# Patient Record
Sex: FEMALE | Race: BLACK OR AFRICAN AMERICAN | Employment: FULL TIME | URBAN - METROPOLITAN AREA
[De-identification: names, ages, dates, MRNs, and addresses within clinical notes are randomized per-mention and may not be internally consistent; named-entity substitution may affect disease eponyms.]

---

## 2022-03-02 ENCOUNTER — APPOINTMENT (OUTPATIENT)
Dept: GENERAL RADIOLOGY | Age: 47
End: 2022-03-02
Payer: COMMERCIAL

## 2022-03-02 ENCOUNTER — HOSPITAL ENCOUNTER (EMERGENCY)
Age: 47
Discharge: HOME | End: 2022-03-02
Payer: COMMERCIAL

## 2022-03-02 VITALS
SYSTOLIC BLOOD PRESSURE: 146 MMHG | HEART RATE: 92 BPM | DIASTOLIC BLOOD PRESSURE: 89 MMHG | TEMPERATURE: 99.1 F | OXYGEN SATURATION: 99 % | RESPIRATION RATE: 16 BRPM

## 2022-03-02 DIAGNOSIS — T14.90XA OCCUPATIONAL INJURY: ICD-10-CM

## 2022-03-02 DIAGNOSIS — R03.0 ELEVATED BLOOD-PRESSURE READING WITHOUT DIAGNOSIS OF HYPERTENSION: ICD-10-CM

## 2022-03-02 DIAGNOSIS — S61.210A LACERATION OF RIGHT INDEX FINGER WITHOUT FOREIGN BODY WITHOUT DAMAGE TO NAIL, INITIAL ENCOUNTER: Primary | ICD-10-CM

## 2022-03-02 DIAGNOSIS — Z23 NEED FOR TETANUS BOOSTER: ICD-10-CM

## 2022-03-02 PROCEDURE — 90715 TDAP VACCINE 7 YRS/> IM: CPT

## 2022-03-02 PROCEDURE — 6360000002 HC RX W HCPCS

## 2022-03-02 PROCEDURE — 12001 RPR S/N/AX/GEN/TRNK 2.5CM/<: CPT

## 2022-03-02 PROCEDURE — 2500000003 HC RX 250 WO HCPCS

## 2022-03-02 PROCEDURE — 90471 IMMUNIZATION ADMIN: CPT

## 2022-03-02 PROCEDURE — 6370000000 HC RX 637 (ALT 250 FOR IP)

## 2022-03-02 PROCEDURE — 99283 EMERGENCY DEPT VISIT LOW MDM: CPT

## 2022-03-02 PROCEDURE — 73130 X-RAY EXAM OF HAND: CPT

## 2022-03-02 RX ORDER — BACITRACIN, NEOMYCIN, POLYMYXIN B 400; 3.5; 5 [USP'U]/G; MG/G; [USP'U]/G
OINTMENT TOPICAL 2 TIMES DAILY
Status: DISCONTINUED | OUTPATIENT
Start: 2022-03-02 | End: 2022-03-02 | Stop reason: HOSPADM

## 2022-03-02 RX ORDER — IBUPROFEN 400 MG/1
400 TABLET ORAL ONCE
Status: DISCONTINUED | OUTPATIENT
Start: 2022-03-02 | End: 2022-03-02 | Stop reason: HOSPADM

## 2022-03-02 RX ORDER — ACETAMINOPHEN 500 MG
1000 TABLET ORAL ONCE
Status: COMPLETED | OUTPATIENT
Start: 2022-03-02 | End: 2022-03-02

## 2022-03-02 RX ADMIN — LIDOCAINE HYDROCHLORIDE 5 ML: 10 INJECTION, SOLUTION EPIDURAL; INFILTRATION; INTRACAUDAL; PERINEURAL at 20:39

## 2022-03-02 RX ADMIN — TETANUS TOXOID, REDUCED DIPHTHERIA TOXOID AND ACELLULAR PERTUSSIS VACCINE, ADSORBED 0.5 ML: 5; 2.5; 8; 8; 2.5 SUSPENSION INTRAMUSCULAR at 20:07

## 2022-03-02 RX ADMIN — BACITRACIN ZINC, NEOMYCIN, POLYMYXIN B SULFAT: 5000; 3.5; 4 OINTMENT TOPICAL at 20:40

## 2022-03-02 RX ADMIN — ACETAMINOPHEN 1000 MG: 500 TABLET ORAL at 19:46

## 2022-03-02 ASSESSMENT — ENCOUNTER SYMPTOMS
SHORTNESS OF BREATH: 0
NAUSEA: 0
RHINORRHEA: 0
EYE PAIN: 0
CONSTIPATION: 0
COUGH: 0
SORE THROAT: 0
VOMITING: 0
BACK PAIN: 0
DIARRHEA: 0
ABDOMINAL PAIN: 0

## 2022-03-02 ASSESSMENT — PAIN SCALES - GENERAL
PAINLEVEL_OUTOF10: 1
PAINLEVEL_OUTOF10: 1

## 2022-03-02 NOTE — Clinical Note
Ron Banks was seen and treated in our emergency department on 3/2/2022. She may return to work on 03/04/2022. May return to work as cleared by [employer]. clinic. Must keep splint on until stitches are removed unless cleared by [employer]. clinic     If you have any questions or concerns, please don't hesitate to call.       Luisa Davenport, CARLIN - CNP

## 2022-03-03 NOTE — ED NOTES
Pt confirmed d/c paperwork have correct name. Discharge and education instructions reviewed with patient. Teach-back successful. Pt verbalized understanding and signed d/c papers. Pt denied questions at this time. No acute distress noted. Patient instructed to follow-up as noted - return to emergency department if symptoms worsen. Patient verbalized understanding. Discharged per EDMD with discharge instructions. Pt discharged per to private vehicle. Patient stable upon departure. Thanked patient for choosing [facility]) for care.               Kameron Perez RN  03/02/22 7272

## 2022-03-03 NOTE — ED PROVIDER NOTES
[address] [facility]        Pt Name: Luther Yates  MRN: [id number]  [place] 1975  Date of evaluation: 3/2/2022  Provider: CARLIN Weaver CNP  PCP: No primary care provider on file. Note Started: 7:46 PM EST      KENNY. I have evaluated this patient. My supervising physician was available for consultation. Triage CHIEF COMPLAINT       Chief Complaint   Patient presents with    Laceration     works at [employer], [place], cut R knuckle while trying to install a bolt; workers comp         HISTORY OF PRESENT ILLNESS   (Location/Symptom, Timing/Onset, Context/Setting, Quality, Duration, Modifying Factors, Severity)  Note limiting factors. Luther Yates is a 48 y.o. female who presents to the ED reporting she works at [employer] [place] and roughly at 6 PM was working on a transmission and her hand slipped off the tools he was using resulting in a cut to dorsal surface of right hand to the first MCP joint. No active bleeding. She does not know when her last tetanus shot was. Nursing Notes were all reviewed and agreed with or any disagreements were addressed in the HPI. REVIEW OF SYSTEMS    (2-9 systems for level 4, 10 or more for level 5)     Review of Systems   Constitutional: Negative for chills, diaphoresis and fever. HENT: Negative for congestion, rhinorrhea and sore throat. Eyes: Negative for pain and visual disturbance. Respiratory: Negative for cough and shortness of breath. Cardiovascular: Negative for chest pain and leg swelling. Gastrointestinal: Negative for abdominal pain, constipation, diarrhea, nausea and vomiting. Genitourinary: Negative for frequency and hematuria. Musculoskeletal: Negative for back pain and neck pain. Skin: Positive for wound. Negative for rash. Neurological: Negative for dizziness and light-headedness. PAST MEDICAL HISTORY   History reviewed.  No pertinent past medical history. SURGICAL HISTORY     Past Surgical History:   Procedure Laterality Date    TUBAL LIGATION         CURRENTMEDICATIONS       Previous Medications    IBUPROFEN (ADVIL;MOTRIN) 800 MG TABLET    Take 1 tablet by mouth every 8 hours as needed for Pain       ALLERGIES     Patient has no known allergies. FAMILYHISTORY     History reviewed. No pertinent family history. SOCIAL HISTORY       Social History     Socioeconomic History    Marital status: Single     Spouse name: None    Number of children: None    Years of education: None    Highest education level: None   Occupational History    None   Tobacco Use    Smoking status: Current Every Day Smoker     Packs/day: 0.50     Types: Cigarettes    Smokeless tobacco: Never Used   Substance and Sexual Activity    Alcohol use: Yes     Comment: social    Drug use: Yes     Types: Marijuana Gonzalo Natasha)    Sexual activity: Yes     Partners: Male   Other Topics Concern    None   Social History Narrative    None     Social Determinants of Health     Financial Resource Strain:     Difficulty of Paying Living Expenses: Not on file   Food Insecurity:     Worried About Running Out of Food in the Last Year: Not on file    Glenn of Food in the Last Year: Not on file   Transportation Needs:     Lack of Transportation (Medical): Not on file    Lack of Transportation (Non-Medical):  Not on file   Physical Activity:     Days of Exercise per Week: Not on file    Minutes of Exercise per Session: Not on file   Stress:     Feeling of Stress : Not on file   Social Connections:     Frequency of Communication with Friends and Family: Not on file    Frequency of Social Gatherings with Friends and Family: Not on file    Attends Restorationism Services: Not on file    Active Member of Clubs or Organizations: Not on file    Attends Club or Organization Meetings: Not on file    Marital Status: Not on file   Intimate Partner Violence:     Fear of Current or Ex-Partner: Not on file    Emotionally Abused: Not on file    Physically Abused: Not on file    Sexually Abused: Not on file   Housing Stability:     Unable to Pay for Housing in the Last Year: Not on file    Number of Places Lived in the Last Year: Not on file    Unstable Housing in the Last Year: Not on file       SCREENINGS    Alysia Coma Scale  Eye Opening: Spontaneous  Best Verbal Response: Oriented  Best Motor Response: Obeys commands  Alysia Coma Scale Score: 15        PHYSICAL EXAM    (up to 7 for level 4, 8 or more for level 5)     ED Triage Vitals [03/02/22 1915]   BP Temp Temp Source Pulse Resp SpO2 Height Weight   (!) 153/85 99.1 °F (37.3 °C) Oral 102 18 99 % -- --     Vitals:    03/02/22 2050   BP: (!) 146/89   Pulse: 92   Resp: 16   Temp: 99.1 °F (37.3 °C)   SpO2:        Physical Exam  Vitals and nursing note reviewed. Constitutional:       General: She is not in acute distress. Appearance: Normal appearance. She is not ill-appearing, toxic-appearing or diaphoretic. HENT:      Head: Normocephalic and atraumatic. Right Ear: External ear normal.      Left Ear: External ear normal.      Nose: Nose normal.      Mouth/Throat:      Mouth: Mucous membranes are moist.      Pharynx: Oropharynx is clear. Eyes:      General:         Right eye: No discharge. Left eye: No discharge. Cardiovascular:      Rate and Rhythm: Normal rate and regular rhythm. Pulses: Normal pulses. Heart sounds: Normal heart sounds. No murmur heard. No friction rub. No gallop. Pulmonary:      Effort: Pulmonary effort is normal. No respiratory distress. Breath sounds: Normal breath sounds. No stridor. No wheezing, rhonchi or rales. Abdominal:      General: There is no distension. Palpations: Abdomen is soft. Tenderness: There is no abdominal tenderness. Musculoskeletal:         General: Signs of injury present. No deformity. Normal range of motion.         Hands:       Cervical back: Normal range of motion and neck supple. Skin:     General: Skin is warm and dry. Capillary Refill: Capillary refill takes less than 2 seconds. Coloration: Skin is not pale. Findings: Laceration present. No rash. Comments: Roughly 1 cm laceration to right MCP joint on digit #1. Well approximated no active bleeding   Neurological:      General: No focal deficit present. Mental Status: She is alert and oriented to person, place, and time. Psychiatric:         Mood and Affect: Mood normal.         Behavior: Behavior normal.         DIAGNOSTIC RESULTS   LABS:    Labs Reviewed - No data to display    When ordered, only abnormal lab results are displayed. All other labs were within normal range or not returned as of this dictation. EKG: When ordered, EKG's are interpreted by the Emergency Department Physician in the absence of a cardiologist.  Please see their note for interpretation of EKG. RADIOLOGY:   Non-plain film images such as CT, Ultrasound and MRI are read by the radiologist. Plain radiographic images are visualized andpreliminarily interpreted by the  ED Provider with the below findings:        Interpretation perthe Radiologist below, if available at the time of this note:    XR HAND LEFT (MIN 3 VIEWS)   Final Result   No radiopaque foreign object is visualized. No acute fracture. No results found. PROCEDURES   Unless otherwise noted below, none     Lac Repair    Date/Time: 3/2/2022 8:34 PM  Performed by: CARLIN Tierney CNP  Authorized by: CARLIN Tierney CNP     Consent:     Consent obtained:  Verbal    Consent given by:  Patient    Risks discussed:  Infection, pain, retained foreign body, poor cosmetic result, nerve damage, poor wound healing, vascular damage and tendon damage    Alternatives discussed:  No treatment  Anesthesia (see MAR for exact dosages):      Anesthesia method:  Local infiltration    Local anesthetic:  Lidocaine 1% w/o epi and bupivacaine 0.5% w/o epi  Laceration details:     Location:  Hand    Hand location:  L hand, dorsum    Length (cm):  1.1    Depth (mm):  0.3  Repair type:     Repair type:  Simple  Pre-procedure details:     Preparation:  Patient was prepped and draped in usual sterile fashion and imaging obtained to evaluate for foreign bodies  Exploration:     Hemostasis achieved with:  Direct pressure    Wound exploration: wound explored through full range of motion      Wound extent: no foreign bodies/material noted      Contaminated: no    Treatment:     Area cleansed with:  Sarmad    Amount of cleaning:  Standard    Irrigation solution:  Sterile water    Visualized foreign bodies/material removed: no    Skin repair:     Repair method:  Sutures    Suture size:  4-0    Suture material:  Prolene    Suture technique:  Simple interrupted  Approximation:     Approximation:  Close  Post-procedure details:     Dressing:  Antibiotic ointment, non-adherent dressing, sterile dressing and splint for protection    Patient tolerance of procedure:   Tolerated well, no immediate complications        CRITICAL CARE TIME   N/A    CONSULTS:  None      EMERGENCY DEPARTMENT COURSE and DIFFERENTIAL DIAGNOSIS/MDM:   Vitals:    Vitals:    03/02/22 1915 03/02/22 2050   BP: (!) 153/85 (!) 146/89   Pulse: 102 92   Resp: 18 16   Temp: 99.1 °F (37.3 °C) 99.1 °F (37.3 °C)   TempSrc: Oral Oral   SpO2: 99%        Patient was given thefollowing medications:  Medications   ibuprofen (ADVIL;MOTRIN) tablet 400 mg (400 mg Oral Not Given 3/2/22 1947)   neomycin-bacitracin-polymyxin (NEOSPORIN) ointment ( Topical Given 3/2/22 2040)   acetaminophen (TYLENOL) tablet 1,000 mg (1,000 mg Oral Given 3/2/22 1946)   tetanus-diphth-acell pertussis (BOOSTRIX) injection 0.5 mL (0.5 mLs IntraMUSCular Given 3/2/22 2007)   lidocaine 1 % injection 5 mL (5 mLs IntraDERmal Given by Other 3/2/22 2039)           47-year-old female presenting roughly an hour and a half after a laceration. Please see presentation HPI. Differential diagnosis: Tendon laceration, neurologic injury, vascular injury, involvement of bone that could lead to osteomyelitis, retained foreign body, delayed bacterial skin infection, other  Patient is neurovascularly intact with excellent capillary refill distal to the injury. There is not appear to be any tendon damage at this time. Patient has excellent strength and range of motion distal to the injury. Sensation is intact distal to the injury. We will treat patient with some acetaminophen and ibuprofen, update her tetanus. This wound will require some sutures. Please see procedure note above. Patient is to be discharged home with follow-up at occupational health. I discussed with patient the results of evaluation in the ED, diagnosis, care, and prognosis. The plan is to discharge to home. Patient is in agreement with plan and questions have been answered. I also discussed with patient the reasons which may require a return visit and the importance of follow-up care. The patient is well-appearing, nontoxic, and improved at the time of discharge. Patient agrees to call to arrange follow-up care as directed. Patient understands to return immediately for worsening/change in symptoms. Incidentally, her blood pressure is very mildly elevated 146/89 on repeat. I suspect there is a component of pain and anxiety with the sutures causing this. However the patient will be referred to PCP for a blood pressure recheck did discuss this with the patient who is agreeable however this remains a work-related injury  FINAL IMPRESSION      1. Laceration of right index finger without foreign body without damage to nail, initial encounter    2. Need for tetanus booster    3. Elevated blood-pressure reading without diagnosis of hypertension    4.  Occupational injury          DISPOSITION/PLAN   DISPOSITION        PATIENT REFERREDTO:  [facility]) [address] [place] [address]  [phone]    Call in 1 day  For your Worker's Comp. injury.   You should make an appointment in 7 to 10 days to get the sutures removed    [facility]) Pre-Services  [phone]  Call in 2 days  As needed to establish care with a PCP      DISCHARGE MEDICATIONS:  New Prescriptions    No medications on file       DISCONTINUED MEDICATIONS:  Discontinued Medications    No medications on file              (Please note that portions ofthis note were completed with a voice recognition program.  Efforts were made to edit the dictations but occasionally words are mis-transcribed.)    CARLIN Gurrola CNP (electronically signed)             CARLIN Gurrola CNP  03/02/22 2101       CARLIN Gurrola CNP  03/02/22 2102

## 2024-09-09 ENCOUNTER — TELEPHONE (OUTPATIENT)
Dept: PRIMARY CARE CLINIC | Age: 49
End: 2024-09-09

## 2025-01-07 ENCOUNTER — TELEPHONE (OUTPATIENT)
Dept: PRIMARY CARE CLINIC | Age: 50
End: 2025-01-07

## 2025-01-07 NOTE — TELEPHONE ENCOUNTER
----- Message from Nathanael RILEY sent at 1/7/2025 10:35 AM EST -----  Regarding: [facility] Appointment Request  [facility] Appointment Request    Patient needs appointment for E[facility] Appointment Type: New Patient.    Patient Requested Dates(s): First Available  Patient Requested Time: Morning  Provider Name: Wilmer Hurley MD/ Ale Schneider DO/ Juliana Welsh MD      Reason for Appointment Request: New Patient - No appointments available during search    The patient wants to set an appointment to be establish for either Juliana Guillen or Ale, whoever is available first. The patient wants to be call back, because she isn't receiving one for the last she set an appointment request.  --------------------------------------------------------------------------------------------------------------------------    Relationship to Patient: Self     Call Back Information: OK to leave message on voicemail  Preferred Call Back Number: Phone [phone]

## 2025-02-21 ENCOUNTER — APPOINTMENT (OUTPATIENT)
Dept: GENERAL RADIOLOGY | Age: 50
DRG: 195 | End: 2025-02-21
Payer: COMMERCIAL

## 2025-02-21 ENCOUNTER — HOSPITAL ENCOUNTER (INPATIENT)
Age: 50
LOS: 2 days | Discharge: HOME | DRG: 195 | End: 2025-02-23
Attending: STUDENT IN AN ORGANIZED HEALTH CARE EDUCATION/TRAINING PROGRAM | Admitting: HOSPITALIST
Payer: COMMERCIAL

## 2025-02-21 ENCOUNTER — APPOINTMENT (OUTPATIENT)
Dept: CT IMAGING | Age: 50
DRG: 195 | End: 2025-02-21
Payer: COMMERCIAL

## 2025-02-21 DIAGNOSIS — J10.1 INFLUENZA A: ICD-10-CM

## 2025-02-21 DIAGNOSIS — J18.9 MULTIFOCAL PNEUMONIA: ICD-10-CM

## 2025-02-21 DIAGNOSIS — E87.6 HYPOKALEMIA: ICD-10-CM

## 2025-02-21 DIAGNOSIS — A41.9 SEPSIS, DUE TO UNSPECIFIED ORGANISM, UNSPECIFIED WHETHER ACUTE ORGAN DYSFUNCTION PRESENT (HCC): Primary | ICD-10-CM

## 2025-02-21 LAB
ALBUMIN SERPL-MCNC: 3.9 G/DL (ref 3.4–5)
ALBUMIN/GLOB SERPL: 1 {RATIO} (ref 1.1–2.2)
ALP SERPL-CCNC: 74 U/L (ref 40–129)
ALT SERPL-CCNC: 26 U/L (ref 10–40)
ANION GAP SERPL CALCULATED.3IONS-SCNC: 14 MMOL/L (ref 3–16)
AST SERPL-CCNC: 40 U/L (ref 15–37)
BASE EXCESS BLDV CALC-SCNC: 0 MMOL/L (ref -3–3)
BASOPHILS # BLD: 0.1 K/UL (ref 0–0.2)
BASOPHILS NFR BLD: 0.4 %
BILIRUB SERPL-MCNC: 0.5 MG/DL (ref 0–1)
BUN SERPL-MCNC: 4 MG/DL (ref 7–20)
CA-I BLD-SCNC: 1.08 MMOL/L (ref 1.12–1.32)
CALCIUM SERPL-MCNC: 9.4 MG/DL (ref 8.3–10.6)
CHLORIDE SERPL-SCNC: 98 MMOL/L (ref 99–110)
CO2 BLDV-SCNC: 24 MMOL/L
CO2 SERPL-SCNC: 20 MMOL/L (ref 21–32)
CREAT SERPL-MCNC: 0.6 MG/DL (ref 0.6–1.1)
DEPRECATED RDW RBC AUTO: 13.8 % (ref 12.4–15.4)
EKG ATRIAL RATE: 111 BPM
EKG DIAGNOSIS: NORMAL
EKG P AXIS: 73 DEGREES
EKG P-R INTERVAL: 130 MS
EKG Q-T INTERVAL: 328 MS
EKG QRS DURATION: 74 MS
EKG QTC CALCULATION (BAZETT): 446 MS
EKG R AXIS: -36 DEGREES
EKG T AXIS: 50 DEGREES
EKG VENTRICULAR RATE: 111 BPM
EOSINOPHIL # BLD: 0 K/UL (ref 0–0.6)
EOSINOPHIL NFR BLD: 0 %
GFR SERPLBLD CREATININE-BSD FMLA CKD-EPI: >90 ML/MIN/{1.73_M2}
GLUCOSE BLD-MCNC: 126 MG/DL (ref 70–99)
GLUCOSE SERPL-MCNC: 139 MG/DL (ref 70–99)
HCO3 BLDV-SCNC: 22.9 MMOL/L (ref 23–29)
HCT VFR BLD AUTO: 38 % (ref 36–48)
HCT VFR BLD AUTO: 42.2 % (ref 36–48)
HGB BLD CALC-MCNC: 13 GM/DL (ref 12–16)
HGB BLD-MCNC: 14.2 G/DL (ref 12–16)
LACTATE BLD-SCNC: 0.93 MMOL/L (ref 0.4–2)
LACTATE BLDV-SCNC: 1.3 MMOL/L (ref 0.4–1.9)
LACTATE BLDV-SCNC: 2.8 MMOL/L (ref 0.4–1.9)
LYMPHOCYTES # BLD: 1.3 K/UL (ref 1–5.1)
LYMPHOCYTES NFR BLD: 8.6 %
MCH RBC QN AUTO: 29.9 PG (ref 26–34)
MCHC RBC AUTO-ENTMCNC: 33.7 G/DL (ref 31–36)
MCV RBC AUTO: 88.9 FL (ref 80–100)
MONOCYTES # BLD: 1.3 K/UL (ref 0–1.3)
MONOCYTES NFR BLD: 8.5 %
NEUTROPHILS # BLD: 12.7 K/UL (ref 1.7–7.7)
NEUTROPHILS NFR BLD: 82.5 %
NT-PROBNP SERPL-MCNC: 73 PG/ML (ref 0–124)
PCO2 BLDV: 28.9 MM HG (ref 40–50)
PERFORMED ON: ABNORMAL
PERFORMED ON: ABNORMAL
PH BLDV: 7.51 [PH] (ref 7.35–7.45)
PLATELET # BLD AUTO: 382 K/UL (ref 135–450)
PMV BLD AUTO: 8.6 FL (ref 5–10.5)
PO2 BLDV: 55 MM HG
POC CREATININE: 0.4 MG/DL (ref 0.6–1.1)
POC SAMPLE TYPE: ABNORMAL
POC SAMPLE TYPE: ABNORMAL
POTASSIUM BLD-SCNC: 3.1 MMOL/L (ref 3.5–5.1)
POTASSIUM SERPL-SCNC: 4.3 MMOL/L (ref 3.5–5.1)
PROT SERPL-MCNC: 7.7 G/DL (ref 6.4–8.2)
RBC # BLD AUTO: 4.75 M/UL (ref 4–5.2)
SAO2 % BLDV: 91 %
SODIUM BLD-SCNC: 139 MMOL/L (ref 136–145)
SODIUM SERPL-SCNC: 132 MMOL/L (ref 136–145)
TROPONIN, HIGH SENSITIVITY: <6 NG/L (ref 0–14)
WBC # BLD AUTO: 15.4 K/UL (ref 4–11)

## 2025-02-21 PROCEDURE — 94640 AIRWAY INHALATION TREATMENT: CPT

## 2025-02-21 PROCEDURE — 94760 N-INVAS EAR/PLS OXIMETRY 1: CPT

## 2025-02-21 PROCEDURE — 83880 ASSAY OF NATRIURETIC PEPTIDE: CPT

## 2025-02-21 PROCEDURE — 1200000000 HC SEMI PRIVATE

## 2025-02-21 PROCEDURE — 2580000003 HC RX 258: Performed by: HOSPITALIST

## 2025-02-21 PROCEDURE — 2580000003 HC RX 258: Performed by: PHYSICIAN ASSISTANT

## 2025-02-21 PROCEDURE — 96374 THER/PROPH/DIAG INJ IV PUSH: CPT

## 2025-02-21 PROCEDURE — 84295 ASSAY OF SERUM SODIUM: CPT

## 2025-02-21 PROCEDURE — 36415 COLL VENOUS BLD VENIPUNCTURE: CPT

## 2025-02-21 PROCEDURE — 6370000000 HC RX 637 (ALT 250 FOR IP): Performed by: PHYSICIAN ASSISTANT

## 2025-02-21 PROCEDURE — 80053 COMPREHEN METABOLIC PANEL: CPT

## 2025-02-21 PROCEDURE — 6360000004 HC RX CONTRAST MEDICATION: Performed by: PHYSICIAN ASSISTANT

## 2025-02-21 PROCEDURE — 96375 TX/PRO/DX INJ NEW DRUG ADDON: CPT

## 2025-02-21 PROCEDURE — 99285 EMERGENCY DEPT VISIT HI MDM: CPT

## 2025-02-21 PROCEDURE — 85014 HEMATOCRIT: CPT

## 2025-02-21 PROCEDURE — 82565 ASSAY OF CREATININE: CPT

## 2025-02-21 PROCEDURE — 87040 BLOOD CULTURE FOR BACTERIA: CPT

## 2025-02-21 PROCEDURE — 82803 BLOOD GASES ANY COMBINATION: CPT

## 2025-02-21 PROCEDURE — 2500000003 HC RX 250 WO HCPCS: Performed by: HOSPITALIST

## 2025-02-21 PROCEDURE — 93005 ELECTROCARDIOGRAM TRACING: CPT | Performed by: STUDENT IN AN ORGANIZED HEALTH CARE EDUCATION/TRAINING PROGRAM

## 2025-02-21 PROCEDURE — 2580000003 HC RX 258: Performed by: STUDENT IN AN ORGANIZED HEALTH CARE EDUCATION/TRAINING PROGRAM

## 2025-02-21 PROCEDURE — 93010 ELECTROCARDIOGRAM REPORT: CPT | Performed by: INTERNAL MEDICINE

## 2025-02-21 PROCEDURE — 2500000003 HC RX 250 WO HCPCS: Performed by: STUDENT IN AN ORGANIZED HEALTH CARE EDUCATION/TRAINING PROGRAM

## 2025-02-21 PROCEDURE — 71046 X-RAY EXAM CHEST 2 VIEWS: CPT

## 2025-02-21 PROCEDURE — 85025 COMPLETE CBC W/AUTO DIFF WBC: CPT

## 2025-02-21 PROCEDURE — 6360000002 HC RX W HCPCS: Performed by: PHYSICIAN ASSISTANT

## 2025-02-21 PROCEDURE — 71260 CT THORAX DX C+: CPT

## 2025-02-21 PROCEDURE — 84132 ASSAY OF SERUM POTASSIUM: CPT

## 2025-02-21 PROCEDURE — 83605 ASSAY OF LACTIC ACID: CPT

## 2025-02-21 PROCEDURE — 82330 ASSAY OF CALCIUM: CPT

## 2025-02-21 PROCEDURE — 6370000000 HC RX 637 (ALT 250 FOR IP): Performed by: HOSPITALIST

## 2025-02-21 PROCEDURE — 84484 ASSAY OF TROPONIN QUANT: CPT

## 2025-02-21 PROCEDURE — 82947 ASSAY GLUCOSE BLOOD QUANT: CPT

## 2025-02-21 PROCEDURE — 6360000002 HC RX W HCPCS: Performed by: HOSPITALIST

## 2025-02-21 PROCEDURE — 6360000002 HC RX W HCPCS: Performed by: STUDENT IN AN ORGANIZED HEALTH CARE EDUCATION/TRAINING PROGRAM

## 2025-02-21 RX ORDER — ACETAMINOPHEN 325 MG/1
650 TABLET ORAL EVERY 6 HOURS PRN
Status: DISCONTINUED | OUTPATIENT
Start: 2025-02-21 | End: 2025-02-23 | Stop reason: HOSPADM

## 2025-02-21 RX ORDER — MAGNESIUM 30 MG
30 TABLET ORAL 2 TIMES DAILY
COMMUNITY

## 2025-02-21 RX ORDER — POTASSIUM CHLORIDE 1500 MG/1
40 TABLET, EXTENDED RELEASE ORAL ONCE
Status: COMPLETED | OUTPATIENT
Start: 2025-02-21 | End: 2025-02-21

## 2025-02-21 RX ORDER — SODIUM CHLORIDE 9 MG/ML
INJECTION, SOLUTION INTRAVENOUS CONTINUOUS
Status: DISCONTINUED | OUTPATIENT
Start: 2025-02-21 | End: 2025-02-22

## 2025-02-21 RX ORDER — LECITHIN 1200 MG
160 CAPSULE ORAL DAILY
COMMUNITY

## 2025-02-21 RX ORDER — METHYLPREDNISOLONE SODIUM SUCCINATE 125 MG/2ML
125 INJECTION INTRAMUSCULAR; INTRAVENOUS ONCE
Status: COMPLETED | OUTPATIENT
Start: 2025-02-21 | End: 2025-02-21

## 2025-02-21 RX ORDER — ALBUTEROL SULFATE 0.83 MG/ML
2.5 SOLUTION RESPIRATORY (INHALATION)
Status: DISCONTINUED | OUTPATIENT
Start: 2025-02-21 | End: 2025-02-21

## 2025-02-21 RX ORDER — ENOXAPARIN SODIUM 100 MG/ML
40 INJECTION SUBCUTANEOUS DAILY
Status: DISCONTINUED | OUTPATIENT
Start: 2025-02-22 | End: 2025-02-23 | Stop reason: HOSPADM

## 2025-02-21 RX ORDER — SODIUM CHLORIDE 0.9 % (FLUSH) 0.9 %
5-40 SYRINGE (ML) INJECTION PRN
Status: DISCONTINUED | OUTPATIENT
Start: 2025-02-21 | End: 2025-02-23 | Stop reason: HOSPADM

## 2025-02-21 RX ORDER — ALBUTEROL SULFATE 0.83 MG/ML
2.5 SOLUTION RESPIRATORY (INHALATION)
Status: DISCONTINUED | OUTPATIENT
Start: 2025-02-21 | End: 2025-02-22

## 2025-02-21 RX ORDER — ONDANSETRON 2 MG/ML
4 INJECTION INTRAMUSCULAR; INTRAVENOUS EVERY 6 HOURS PRN
Status: DISCONTINUED | OUTPATIENT
Start: 2025-02-21 | End: 2025-02-23 | Stop reason: HOSPADM

## 2025-02-21 RX ORDER — 0.9 % SODIUM CHLORIDE 0.9 %
1000 INTRAVENOUS SOLUTION INTRAVENOUS ONCE
Status: COMPLETED | OUTPATIENT
Start: 2025-02-21 | End: 2025-02-21

## 2025-02-21 RX ORDER — MAGNESIUM SULFATE IN WATER 40 MG/ML
2000 INJECTION, SOLUTION INTRAVENOUS PRN
Status: DISCONTINUED | OUTPATIENT
Start: 2025-02-21 | End: 2025-02-23 | Stop reason: HOSPADM

## 2025-02-21 RX ORDER — POLYETHYLENE GLYCOL 3350 17 G/17G
17 POWDER, FOR SOLUTION ORAL DAILY PRN
Status: DISCONTINUED | OUTPATIENT
Start: 2025-02-21 | End: 2025-02-23 | Stop reason: HOSPADM

## 2025-02-21 RX ORDER — GUAIFENESIN/DEXTROMETHORPHAN 100-10MG/5
5 SYRUP ORAL EVERY 4 HOURS PRN
Status: DISCONTINUED | OUTPATIENT
Start: 2025-02-21 | End: 2025-02-23 | Stop reason: HOSPADM

## 2025-02-21 RX ORDER — IOPAMIDOL 755 MG/ML
75 INJECTION, SOLUTION INTRAVASCULAR
Status: COMPLETED | OUTPATIENT
Start: 2025-02-21 | End: 2025-02-21

## 2025-02-21 RX ORDER — SODIUM CHLORIDE 0.9 % (FLUSH) 0.9 %
5-40 SYRINGE (ML) INJECTION EVERY 12 HOURS SCHEDULED
Status: DISCONTINUED | OUTPATIENT
Start: 2025-02-21 | End: 2025-02-23 | Stop reason: HOSPADM

## 2025-02-21 RX ORDER — ACETAMINOPHEN 325 MG/1
650 TABLET ORAL EVERY 6 HOURS PRN
COMMUNITY

## 2025-02-21 RX ORDER — IPRATROPIUM BROMIDE AND ALBUTEROL SULFATE 2.5; .5 MG/3ML; MG/3ML
3 SOLUTION RESPIRATORY (INHALATION) ONCE
Status: COMPLETED | OUTPATIENT
Start: 2025-02-21 | End: 2025-02-21

## 2025-02-21 RX ORDER — GUAIFENESIN 600 MG/1
600 TABLET, EXTENDED RELEASE ORAL 2 TIMES DAILY
Status: DISCONTINUED | OUTPATIENT
Start: 2025-02-21 | End: 2025-02-23 | Stop reason: HOSPADM

## 2025-02-21 RX ORDER — ACETAMINOPHEN 500 MG
1000 TABLET ORAL ONCE
Status: COMPLETED | OUTPATIENT
Start: 2025-02-21 | End: 2025-02-21

## 2025-02-21 RX ORDER — SODIUM CHLORIDE 9 MG/ML
INJECTION, SOLUTION INTRAVENOUS PRN
Status: DISCONTINUED | OUTPATIENT
Start: 2025-02-21 | End: 2025-02-23 | Stop reason: HOSPADM

## 2025-02-21 RX ORDER — ONDANSETRON 4 MG/1
4 TABLET, ORALLY DISINTEGRATING ORAL EVERY 8 HOURS PRN
Status: DISCONTINUED | OUTPATIENT
Start: 2025-02-21 | End: 2025-02-23 | Stop reason: HOSPADM

## 2025-02-21 RX ORDER — POTASSIUM CHLORIDE 1500 MG/1
40 TABLET, EXTENDED RELEASE ORAL PRN
Status: DISCONTINUED | OUTPATIENT
Start: 2025-02-21 | End: 2025-02-23 | Stop reason: HOSPADM

## 2025-02-21 RX ORDER — POTASSIUM CHLORIDE 7.45 MG/ML
10 INJECTION INTRAVENOUS PRN
Status: DISCONTINUED | OUTPATIENT
Start: 2025-02-21 | End: 2025-02-23 | Stop reason: HOSPADM

## 2025-02-21 RX ORDER — ACETAMINOPHEN 650 MG/1
650 SUPPOSITORY RECTAL EVERY 6 HOURS PRN
Status: DISCONTINUED | OUTPATIENT
Start: 2025-02-21 | End: 2025-02-23 | Stop reason: HOSPADM

## 2025-02-21 RX ADMIN — ALBUTEROL SULFATE 2.5 MG: 2.5 SOLUTION RESPIRATORY (INHALATION) at 19:52

## 2025-02-21 RX ADMIN — SODIUM CHLORIDE: 9 INJECTION, SOLUTION INTRAVENOUS at 21:03

## 2025-02-21 RX ADMIN — IOPAMIDOL 75 ML: 755 INJECTION, SOLUTION INTRAVENOUS at 13:45

## 2025-02-21 RX ADMIN — SODIUM CHLORIDE 1000 ML: 9 INJECTION, SOLUTION INTRAVENOUS at 14:31

## 2025-02-21 RX ADMIN — WATER 1000 MG: 1 INJECTION INTRAMUSCULAR; INTRAVENOUS; SUBCUTANEOUS at 14:29

## 2025-02-21 RX ADMIN — GUAIFENESIN 600 MG: 600 TABLET ORAL at 20:55

## 2025-02-21 RX ADMIN — ONDANSETRON 4 MG: 2 INJECTION, SOLUTION INTRAMUSCULAR; INTRAVENOUS at 16:40

## 2025-02-21 RX ADMIN — ACETAMINOPHEN 1000 MG: 500 TABLET ORAL at 14:35

## 2025-02-21 RX ADMIN — IPRATROPIUM BROMIDE AND ALBUTEROL SULFATE 3 DOSE: .5; 2.5 SOLUTION RESPIRATORY (INHALATION) at 14:27

## 2025-02-21 RX ADMIN — AZITHROMYCIN MONOHYDRATE 500 MG: 500 INJECTION, POWDER, LYOPHILIZED, FOR SOLUTION INTRAVENOUS at 14:35

## 2025-02-21 RX ADMIN — SODIUM CHLORIDE, PRESERVATIVE FREE 10 ML: 5 INJECTION INTRAVENOUS at 21:02

## 2025-02-21 RX ADMIN — IPRATROPIUM BROMIDE 0.5 MG: 0.5 SOLUTION RESPIRATORY (INHALATION) at 19:52

## 2025-02-21 RX ADMIN — POTASSIUM CHLORIDE 40 MEQ: 1500 TABLET, EXTENDED RELEASE ORAL at 14:35

## 2025-02-21 RX ADMIN — METHYLPREDNISOLONE SODIUM SUCCINATE 125 MG: 125 INJECTION INTRAMUSCULAR; INTRAVENOUS at 14:33

## 2025-02-21 ASSESSMENT — PAIN SCALES - GENERAL: PAINLEVEL_OUTOF10: 7

## 2025-02-21 ASSESSMENT — PAIN - FUNCTIONAL ASSESSMENT: PAIN_FUNCTIONAL_ASSESSMENT: 0-10

## 2025-02-21 ASSESSMENT — PAIN DESCRIPTION - LOCATION: LOCATION: BACK

## 2025-02-21 NOTE — PROGRESS NOTES
Medication Reconciliation    List of medications patient is currently taking is complete.     Source of information: 1. Conversation with patient at bedside                                      2. EPIC records         Notes regarding home medications:   1. Patient prescribed Augmentin 875/125 mg BID x 7 days on 2/19. States she took a dose this morning      Sukh Stevenson, [facility]   2/21/2025  3:03 PM

## 2025-02-21 NOTE — H&P
V2.0  History and Physical      Name:  Octavia Gonsalez /Age/Sex: 1975  (49 y.o. female)   MRN & CSN:  [id number] & [id number] Encounter Date/Time: 2025 2:49 PM EST   [facility] PCP: No primary care provider on file.       Hospital Day: 1    Assessment and Plan:   Octavia Gonsalez is a 49 y.o. female with a pmh of  who presents with Pneumonia, unspecified organism    Hospital Problems             Last Modified POA    * (Principal) Pneumonia, unspecified organism 2025 Yes       Failure of outpatient Antibiotics therapy    Plan:            Admit inpatient status  Telemetry monitering  Empiric IV antibiotics : rocephin and zithromax  Duonebs   O2 via NC to keep O2 sat > 90 %  Check PNA panl , Procal  Check MRSA nasal probe  Blood culture x 2        Chronic conditions :    Recently quit smoking as per patient          DVT ppx  : lovenox    Diet : regular diet    Code status  : full code          Disposition:   Current Living situation: Home  Expected Disposition:home  Estimated D/C: 2-3 days    Diet No diet orders on file   DVT Prophylaxis [] Lovenox, []  Heparin, [] SCDs, [] Ambulation,  [] Eliquis, [] Xarelto, [] Coumadin   Code Status No Order   Surrogate Decision Maker/ POA        Personally reviewed Lab Studies and Imaging      Discussed management of the case with ED  who recommended Hospital admission     EKG interpreted personally and results : no STEMI     Imaging that was interpreted personally includes  CXR  and results :  PNA +     Drugs that require monitoring for toxicity include IVF , IV Antibiotics rocephin  and the method of monitoring was Vitals monitering, BMP and CBC monitering , Telemetry monitering          History from:     patient    History of Present Illness:     Chief Complaint:   Octavia Gonsalez is a 49 y.o. female with pmh of  who presents with     C/o cough  C/o SOB  C/o fever  C/o generalized weakness     Onset of symptoms since ~ 8- 9 days , she went to  few days

## 2025-02-21 NOTE — ACP (ADVANCE CARE PLANNING)
Advance Care Planning   Healthcare Decision Maker:    Primary Decision Maker: Lalita Guerra - [facility] - [phone]      Today we documented Decision Maker(s) consistent with Legal Next of Kin hierarchy.       Electronically signed by Yaneth Oliva on 2/21/2025 at 3:45 PM

## 2025-02-21 NOTE — ED PROVIDER NOTES
ED Attending Attestation Note    I personally saw the patient and made/approved the management plan and take responsibility for patient management.  Staffed with me at: 1324.    Briefly, 49 y.o. female presents with recent diagnosis of pneumonia has been taken antibiotics but has had no improvement and came in with worsening overall symptoms.       Focused exam:   Physical Exam  Vitals and nursing note reviewed.   Constitutional:       General: She is in acute distress.      Appearance: She is ill-appearing. She is not toxic-appearing or diaphoretic.   HENT:      Head: Normocephalic and atraumatic.      Right Ear: External ear normal.      Left Ear: External ear normal.      Nose: Nose normal.   Eyes:      Conjunctiva/sclera: Conjunctivae normal.   Cardiovascular:      Rate and Rhythm: Normal rate and regular rhythm.   Pulmonary:      Effort: Pulmonary effort is normal. No respiratory distress.      Breath sounds: Rhonchi present.   Abdominal:      General: There is no distension.      Tenderness: There is no abdominal tenderness. There is no right CVA tenderness, left CVA tenderness, guarding or rebound.   Skin:     General: Skin is warm and dry.      Capillary Refill: Capillary refill takes less than 2 seconds.   Neurological:      Mental Status: She is alert.           Imaging:   CT CHEST PULMONARY EMBOLISM W CONTRAST   Final Result   1.  No evidence of pulmonary embolism.      2.  Scattered ground-glass pulmonary infiltrates compatible with multifocal   pneumonia         XR CHEST (2 VW)   Final Result   Patchy airspace opacity in the left middle and right perihilar lung zones.   Recommend follow-up until clear.                  I independently interpreted the following studies:   ECG: Sinus tachycardia with ventricular rate of 111, NH of 130, QRS 74, QTc 446, left axis deviation, no clinically significant ST segment elevation or depression, no significant T wave abnormality noted unchanged from prior EKG from

## 2025-02-21 NOTE — CARE COORDINATION
Case Management Assessment  Initial Evaluation    Date/Time of Evaluation: 2/21/2025 3:38 PM  Assessment Completed by: Yaneth Oliva    If patient is discharged prior to next notation, then this note serves as note for discharge by case management.    Patient Name: Octavia Gonsalez                   YOB: 1975  Diagnosis: Pneumonia, unspecified organism [J18.9]                   Date / Time: 2/21/2025 12:33 PM    Patient Admission Status: Inpatient   Readmission Risk (Low < 19, Mod (19-27), High > 27): No data recorded  Current PCP: No primary care provider on file.  PCP verified by CM? Yes    Chart Reviewed: Yes      History Provided by: Patient  Patient Orientation: Alert and Oriented    Patient Cognition: Alert    Hospitalization in the last 30 days (Readmission):  No    If yes, Readmission Assessment in CM Navigator will be completed.    Advance Directives:      Code Status: Not on file   Patient's Primary Decision Maker is: Legal Next of Kin      Discharge Planning:    Patient lives with: Parent Type of Home: Apartment  Primary Care Giver: Self  Patient Support Systems include: Family Members   Current Financial resources: Other (Comment) (negrito)  Current community resources: None  Current services prior to admission: None            Current DME:              Type of Home Care services:  None    ADLS  Prior functional level: Independent in ADLs/IADLs  Current functional level: Independent in ADLs/IADLs    PT AM-PAC:   /24  OT AM-PAC:   /24    Family can provide assistance at DC: Yes  Would you like Case Management to discuss the discharge plan with any other family members/significant others, and if so, who? No  Plans to Return to Present Housing: Yes  Other Identified Issues/Barriers to RETURNING to current housing: None  Potential Assistance needed at discharge: N/A            Potential DME:    Patient expects to discharge to: Apartment  Plan for transportation at discharge: Other (see

## 2025-02-21 NOTE — ED TRIAGE NOTES
Pt was diagnosed with pneumonia on Wednesday at . Pt has been taken amoxicillin since then, and she says she hasn't felt any better. Pt complains of lower back and abdomen pain, chills, cough, and fever.

## 2025-02-21 NOTE — ED PROVIDER NOTES
[facility] EMERGENCY DEPARTMENT  EMERGENCY DEPARTMENT ENCOUNTER        Pt Name: Octavia Gonsalez  MRN: [id number]  Birthdate 1975  Date of evaluation: 2/21/2025  Provider: Sara Aguilar PA-C  PCP: Felicity Barkley MD  Note Started: 2:04 PM EST 2/21/25       I have seen and evaluated this patient with my supervising physician Stephen Sue, *.      CHIEF COMPLAINT       Chief Complaint   Patient presents with    Illness     Pt was diagnosed with pneumonia on Wednesday at . Pt has been taken amoxicillin since then, and she says she hasn't felt any better. Pt complains of lower back and abdomen pain, chills, cough, and fever.        HISTORY OF PRESENT ILLNESS: 1 or more Elements     History From: patient    Octavia Gonsalez is a 49 y.o. female who presents for worsening pneumonia.  Patient was seen 2 days ago a  emergency department and found to have multifocal pneumonia.  She was started on Augmentin which he has been taking.  She reports her cough is worsening and now she short of breath.  She is also had subjective fever and chills.  Cough is productive of white sputum.  She denies hemoptysis.  She denies chest pain.  Denies nausea vomiting.  Her cold symptoms initially started about 1.5 weeks ago.  Reports she was negative for flu and COVID at  ED visit.  She smokes 1/2 pack a day.  No history of lung disease.  History of hypertension.  No other health problems.      Nursing Notes were reviewed and agreed with or any disagreements were addressed in the HPI.    REVIEW OF SYSTEMS :      Review of Systems    Positives and Pertinent negatives as per HPI.     SURGICAL HISTORY     Past Surgical History:   Procedure Laterality Date    TUBAL LIGATION         CURRENTMEDICATIONS       Previous Medications    ACETAMINOPHEN (TYLENOL) 325 MG TABLET    Take 2 tablets by mouth every 6 hours as needed for Pain or Fever    AMOXICILLIN-CLAVULANATE (AUGMENTIN) 875-125 MG PER TABLET    Take 1

## 2025-02-22 LAB
ANION GAP SERPL CALCULATED.3IONS-SCNC: 14 MMOL/L (ref 3–16)
BACTERIA URNS QL MICRO: NORMAL /HPF
BASOPHILS # BLD: 0 K/UL (ref 0–0.2)
BASOPHILS NFR BLD: 0.2 %
BILIRUB UR QL STRIP.AUTO: NEGATIVE
BUN SERPL-MCNC: 4 MG/DL (ref 7–20)
CALCIUM SERPL-MCNC: 9.4 MG/DL (ref 8.3–10.6)
CHLORIDE SERPL-SCNC: 105 MMOL/L (ref 99–110)
CLARITY UR: CLEAR
CO2 SERPL-SCNC: 19 MMOL/L (ref 21–32)
COLOR UR: YELLOW
CREAT SERPL-MCNC: 0.7 MG/DL (ref 0.6–1.1)
DEPRECATED RDW RBC AUTO: 13.7 % (ref 12.4–15.4)
EOSINOPHIL # BLD: 0 K/UL (ref 0–0.6)
EOSINOPHIL NFR BLD: 0 %
EPI CELLS #/AREA URNS AUTO: 3 /HPF (ref 0–5)
GFR SERPLBLD CREATININE-BSD FMLA CKD-EPI: >90 ML/MIN/{1.73_M2}
GLUCOSE SERPL-MCNC: 243 MG/DL (ref 70–99)
GLUCOSE UR STRIP.AUTO-MCNC: >=1000 MG/DL
HCT VFR BLD AUTO: 39.2 % (ref 36–48)
HGB BLD-MCNC: 13 G/DL (ref 12–16)
HGB UR QL STRIP.AUTO: NEGATIVE
HYALINE CASTS #/AREA URNS AUTO: 0 /LPF (ref 0–8)
KETONES UR STRIP.AUTO-MCNC: NEGATIVE MG/DL
LEUKOCYTE ESTERASE UR QL STRIP.AUTO: NEGATIVE
LYMPHOCYTES # BLD: 1 K/UL (ref 1–5.1)
LYMPHOCYTES NFR BLD: 7.6 %
MCH RBC QN AUTO: 29.5 PG (ref 26–34)
MCHC RBC AUTO-ENTMCNC: 33.1 G/DL (ref 31–36)
MCV RBC AUTO: 89.1 FL (ref 80–100)
MONOCYTES # BLD: 1 K/UL (ref 0–1.3)
MONOCYTES NFR BLD: 7.5 %
MRSA DNA SPEC QL NAA+PROBE: NORMAL
NEUTROPHILS # BLD: 10.9 K/UL (ref 1.7–7.7)
NEUTROPHILS NFR BLD: 84.7 %
NITRITE UR QL STRIP.AUTO: NEGATIVE
ORGANISM: ABNORMAL
PH UR STRIP.AUTO: 7.5 [PH] (ref 5–8)
PLATELET # BLD AUTO: 414 K/UL (ref 135–450)
PMV BLD AUTO: 8.8 FL (ref 5–10.5)
POTASSIUM SERPL-SCNC: 4.1 MMOL/L (ref 3.5–5.1)
PROT UR STRIP.AUTO-MCNC: ABNORMAL MG/DL
RBC # BLD AUTO: 4.4 M/UL (ref 4–5.2)
RBC CLUMPS #/AREA URNS AUTO: 1 /HPF (ref 0–4)
REASON FOR REJECTION: NORMAL
REJECTED TEST: NORMAL
REPORT: NORMAL
RESP PATH DNA+RNA PNL L RESP NAA+NON-PRB: ABNORMAL
SARS-COV-2 RDRP RESP QL NAA+PROBE: NOT DETECTED
SODIUM SERPL-SCNC: 138 MMOL/L (ref 136–145)
SP GR UR STRIP.AUTO: 1.01 (ref 1–1.03)
UA COMPLETE W REFLEX CULTURE PNL UR: ABNORMAL
UA DIPSTICK W REFLEX MICRO PNL UR: YES
URN SPEC COLLECT METH UR: ABNORMAL
UROBILINOGEN UR STRIP-ACNC: 0.2 E.U./DL
WBC # BLD AUTO: 12.9 K/UL (ref 4–11)
WBC #/AREA URNS AUTO: 1 /HPF (ref 0–5)

## 2025-02-22 PROCEDURE — 94761 N-INVAS EAR/PLS OXIMETRY MLT: CPT

## 2025-02-22 PROCEDURE — 6370000000 HC RX 637 (ALT 250 FOR IP): Performed by: HOSPITALIST

## 2025-02-22 PROCEDURE — 1200000000 HC SEMI PRIVATE

## 2025-02-22 PROCEDURE — 87635 SARS-COV-2 COVID-19 AMP PRB: CPT

## 2025-02-22 PROCEDURE — 87641 MR-STAPH DNA AMP PROBE: CPT

## 2025-02-22 PROCEDURE — 81001 URINALYSIS AUTO W/SCOPE: CPT

## 2025-02-22 PROCEDURE — 80048 BASIC METABOLIC PNL TOTAL CA: CPT

## 2025-02-22 PROCEDURE — 6360000002 HC RX W HCPCS: Performed by: HOSPITALIST

## 2025-02-22 PROCEDURE — 94640 AIRWAY INHALATION TREATMENT: CPT

## 2025-02-22 PROCEDURE — 36415 COLL VENOUS BLD VENIPUNCTURE: CPT

## 2025-02-22 PROCEDURE — 87633 RESP VIRUS 12-25 TARGETS: CPT

## 2025-02-22 PROCEDURE — 85025 COMPLETE CBC W/AUTO DIFF WBC: CPT

## 2025-02-22 PROCEDURE — 2500000003 HC RX 250 WO HCPCS: Performed by: HOSPITALIST

## 2025-02-22 PROCEDURE — 2580000003 HC RX 258: Performed by: HOSPITALIST

## 2025-02-22 RX ORDER — IPRATROPIUM BROMIDE AND ALBUTEROL SULFATE 2.5; .5 MG/3ML; MG/3ML
1 SOLUTION RESPIRATORY (INHALATION)
Status: DISCONTINUED | OUTPATIENT
Start: 2025-02-22 | End: 2025-02-23 | Stop reason: HOSPADM

## 2025-02-22 RX ORDER — AZITHROMYCIN 500 MG/1
500 TABLET, FILM COATED ORAL DAILY
Status: DISCONTINUED | OUTPATIENT
Start: 2025-02-22 | End: 2025-02-23 | Stop reason: HOSPADM

## 2025-02-22 RX ADMIN — IPRATROPIUM BROMIDE AND ALBUTEROL SULFATE 1 DOSE: 2.5; .5 SOLUTION RESPIRATORY (INHALATION) at 15:18

## 2025-02-22 RX ADMIN — WATER 1000 MG: 1 INJECTION INTRAMUSCULAR; INTRAVENOUS; SUBCUTANEOUS at 13:07

## 2025-02-22 RX ADMIN — GUAIFENESIN 600 MG: 600 TABLET ORAL at 08:31

## 2025-02-22 RX ADMIN — IPRATROPIUM BROMIDE AND ALBUTEROL SULFATE 1 DOSE: 2.5; .5 SOLUTION RESPIRATORY (INHALATION) at 07:58

## 2025-02-22 RX ADMIN — IPRATROPIUM BROMIDE AND ALBUTEROL SULFATE 1 DOSE: 2.5; .5 SOLUTION RESPIRATORY (INHALATION) at 20:04

## 2025-02-22 RX ADMIN — SODIUM CHLORIDE, PRESERVATIVE FREE 10 ML: 5 INJECTION INTRAVENOUS at 21:36

## 2025-02-22 RX ADMIN — ENOXAPARIN SODIUM 40 MG: 100 INJECTION SUBCUTANEOUS at 08:31

## 2025-02-22 RX ADMIN — AZITHROMYCIN 500 MG: 500 TABLET, FILM COATED ORAL at 13:07

## 2025-02-22 RX ADMIN — GUAIFENESIN 600 MG: 600 TABLET ORAL at 21:35

## 2025-02-22 RX ADMIN — SODIUM CHLORIDE: 9 INJECTION, SOLUTION INTRAVENOUS at 10:19

## 2025-02-22 RX ADMIN — IPRATROPIUM BROMIDE AND ALBUTEROL SULFATE 1 DOSE: 2.5; .5 SOLUTION RESPIRATORY (INHALATION) at 11:41

## 2025-02-22 ASSESSMENT — PAIN SCALES - GENERAL: PAINLEVEL_OUTOF10: 0

## 2025-02-22 NOTE — PLAN OF CARE
Problem: Discharge Planning  Goal: Discharge to home or other facility with appropriate resources  Outcome: Progressing  Flowsheets  Taken 2/21/2025 2211 by Ghislaine Glynn RN  Discharge to home or other facility with appropriate resources: Identify barriers to discharge with patient and caregiver  Taken 2/21/2025 1857 by Ai Velazco RN  Discharge to home or other facility with appropriate resources:   Identify barriers to discharge with patient and caregiver   Arrange for needed discharge resources and transportation as appropriate   Identify discharge learning needs (meds, wound care, etc)     Problem: Pain  Goal: Verbalizes/displays adequate comfort level or baseline comfort level  Outcome: Progressing  Flowsheets (Taken 2/21/2025 1850 by Ai Velazco RN)  Verbalizes/displays adequate comfort level or baseline comfort level:   Encourage patient to monitor pain and request assistance   Assess pain using appropriate pain scale   Administer analgesics based on type and severity of pain and evaluate response   Implement non-pharmacological measures as appropriate and evaluate response   Consider cultural and social influences on pain and pain management   Notify Licensed Independent Practitioner if interventions unsuccessful or patient reports new pain     Problem: Safety - Adult  Goal: Free from fall injury  Outcome: Progressing     Problem: Respiratory - Adult  Goal: Achieves optimal ventilation and oxygenation  Outcome: Progressing  Flowsheets (Taken 2/21/2025 2211)  Achieves optimal ventilation and oxygenation: Assess for changes in respiratory status     Problem: Cardiovascular - Adult  Goal: Maintains optimal cardiac output and hemodynamic stability  Outcome: Progressing  Flowsheets (Taken 2/21/2025 2211)  Maintains optimal cardiac output and hemodynamic stability: Monitor blood pressure and heart rate     Problem: Infection - Adult  Goal: Absence of infection at discharge  Outcome:

## 2025-02-22 NOTE — PROGRESS NOTES
4 Eyes Skin Assessment     NAME:  Octavia Gonsalez  YOB: 1975  MEDICAL RECORD NUMBER:  [id number]    The patient is being assessed for  Admission    I agree that at least one RN has performed a thorough Head to Toe Skin Assessment on the patient. ALL assessment sites listed below have been assessed.      Areas assessed by both nurses:    Head, Face, Ears, Shoulders, Back, Chest, Arms, Elbows, Hands, Sacrum. Buttock, Coccyx, Ischium, Legs. Feet and Heels, and Under Medical Devices         Does the Patient have a Wound? No noted wound(s)       Shiv Prevention initiated by RN: No  Wound Care Orders initiated by RN: No    Pressure Injury (Stage 3,4, Unstageable, DTI, NWPT, and Complex wounds) if present, place Wound referral order by RN under : No    New Ostomies, if present place, Ostomy referral order under : No     Nurse 1 eSignature: Electronically signed by Ghislaine Glynn RN on 2/21/25 at 11:31 PM EST    **SHARE this note so that the co-signing nurse can place an eSignature**    Nurse 2 eSignature: Electronically signed by Audrey Peck RN on 2/22/25 at 2:08 AM EST

## 2025-02-22 NOTE — PROGRESS NOTES
Patient admitted to room 4256 from ED.    Oriented to room, call light, and floor policies.   Plan of care reviewed with patient/family.    Pt is resting in bed and denies pain at this time; no s/s of distress noted.   VSS.   Tele in place reading ST rhythm with a rate of 108.  Pt encouraged to call for needs     Report given to oncoming nurse

## 2025-02-22 NOTE — PROGRESS NOTES
4 Eyes Skin Assessment     NAME:  Octavia Gonsalez  YOB: 1975  MEDICAL RECORD NUMBER:  [id number]    The patient is being assessed for  Admission    I agree that at least one RN has performed a thorough Head to Toe Skin Assessment on the patient. ALL assessment sites listed below have been assessed.      Areas assessed by both nurses:    Head, Face, Ears, Shoulders, Back, Chest, Arms, Elbows, Hands, Sacrum. Buttock, Coccyx, Ischium, Legs. Feet and Heels, and Under Medical Devices         Does the Patient have a Wound? No noted wound(s)       Shiv Prevention initiated by RN: No  Wound Care Orders initiated by RN: No    Pressure Injury (Stage 3,4, Unstageable, DTI, NWPT, and Complex wounds) if present, place Wound referral order by RN under : No    New Ostomies, if present place, Ostomy referral order under : No     Nurse 1 eSignature: Electronically signed by Ghislaine Glynn RN on 2/22/25 at 5:36 AM EST    **SHARE this note so that the co-signing nurse can place an eSignature**    Nurse 2 eSignature: {Esignature:[id number]}

## 2025-02-22 NOTE — PROGRESS NOTES
Urine sample collected for ordered urine test, labeled, sent to lab.    Electronically signed by Ghislaine Glynn RN on 2/22/25 at 6:36 AM EST

## 2025-02-22 NOTE — PROGRESS NOTES
Sputum collected for ordered Pneumonia panel, labeled, sent to lab.    Electronically signed by Ghislaine Glynn RN on 2/22/25 at 4:46 AM EST

## 2025-02-22 NOTE — PLAN OF CARE
Problem: Discharge Planning  Goal: Discharge to home or other facility with appropriate resources  2/22/2025 1115 by Baldo Jang RN  Outcome: Progressing  Flowsheets (Taken 2/22/2025 0831)  Discharge to home or other facility with appropriate resources:   Identify barriers to discharge with patient and caregiver   Arrange for needed discharge resources and transportation as appropriate  2/21/2025 2342 by Ghislaine Glynn RN  Outcome: Progressing  Flowsheets  Taken 2/21/2025 2211 by Ghislaine Glynn RN  Discharge to home or other facility with appropriate resources: Identify barriers to discharge with patient and caregiver  Taken 2/21/2025 1857 by Ai Velazco RN  Discharge to home or other facility with appropriate resources:   Identify barriers to discharge with patient and caregiver   Arrange for needed discharge resources and transportation as appropriate   Identify discharge learning needs (meds, wound care, etc)     Problem: Pain  Goal: Verbalizes/displays adequate comfort level or baseline comfort level  2/22/2025 1115 by Baldo Jang RN  Outcome: Progressing  2/21/2025 2342 by Ghislaine Glynn RN  Outcome: Progressing  Flowsheets (Taken 2/21/2025 1850 by Ai Velazco, RN)  Verbalizes/displays adequate comfort level or baseline comfort level:   Encourage patient to monitor pain and request assistance   Assess pain using appropriate pain scale   Administer analgesics based on type and severity of pain and evaluate response   Implement non-pharmacological measures as appropriate and evaluate response   Consider cultural and social influences on pain and pain management   Notify Licensed Independent Practitioner if interventions unsuccessful or patient reports new pain     Problem: Safety - Adult  Goal: Free from fall injury  2/22/2025 1115 by Baldo Jang RN  Outcome: Progressing  2/21/2025 2342 by Ghislaine Glynn RN  Outcome: Progressing     Problem: Respiratory -

## 2025-02-22 NOTE — PROGRESS NOTES
\        V2.0    [facility] [place]      Name:  Octavia Gonsalez /Age/Sex: 1975  (49 y.o. female)   MRN & CSN:  [id number] & [id number] Encounter Date/Time: 2025 11:58 AM EST   Location:  [id number] PCP: Felicity Barkley MD     Attending:Sade Tran MD       Hospital Day: 2    Assessment and Recommendations   Octavia Gonsalez is a 49 y.o. female with pmh of  who presents with Pneumonia, unspecified organism      Plan:     Continue current treatment on      Telemetry monitering  Empiric IV antibiotics : rocephin and zithromax  Duonebs   O2 via NC to keep O2 sat > 90 %  Check PNA panl , Procal  Check MRSA nasal probe  Blood culture x 2      DC IVF on      Chronic conditions :     Recently quit smoking as per patient              DVT ppx  : lovenox     Diet : regular diet     Code status  : full code     Took me more than 51 minutes for clinical management, coordination of care, reviewing labs , ordering medication as needed , reviewing consultants notes and recommendation  including plan of care discussion with the patient ,nursing staff and case management and consultants        Diet ADULT DIET; Regular  ADULT ORAL NUTRITION SUPPLEMENT; Breakfast; Standard High Calorie/High Protein Oral Supplement   DVT Prophylaxis [] Lovenox, []  Heparin, [] SCDs, [] Ambulation,  [] Eliquis, [] Xarelto  [] Coumadin   Code Status Full Code   Disposition From:   Expected Disposition:   Estimated Date of Discharge:   Patient requires continued admission due to    Surrogate Decision Maker/ POA         Personally reviewed Lab Studies and Imaging        Telemetry reviewed by myself no ST elevation           Drugs that require monitoring for toxicity include IV  rocephin   and the method of monitoring was CBC , BMP and vitals monitering          Medical Decision Making:  The following items were considered in medical decision making:  Discussion of patient care with other providers  Reviewed clinical lab

## 2025-02-22 NOTE — PROGRESS NOTES
Specimen collected through nares for ordered MRSA, labeled, sent to lab    Electronically signed by Ghislaine Glynn RN on 2/22/25 at 4:48 AM EST

## 2025-02-23 VITALS
DIASTOLIC BLOOD PRESSURE: 70 MMHG | BODY MASS INDEX: 20.62 KG/M2 | HEART RATE: 90 BPM | WEIGHT: 128.31 LBS | SYSTOLIC BLOOD PRESSURE: 127 MMHG | TEMPERATURE: 99 F | HEIGHT: 66 IN | OXYGEN SATURATION: 98 % | RESPIRATION RATE: 18 BRPM

## 2025-02-23 LAB
ANION GAP SERPL CALCULATED.3IONS-SCNC: 11 MMOL/L (ref 3–16)
BASOPHILS # BLD: 0.1 K/UL (ref 0–0.2)
BASOPHILS NFR BLD: 0.6 %
BUN SERPL-MCNC: 3 MG/DL (ref 7–20)
CALCIUM SERPL-MCNC: 9.8 MG/DL (ref 8.3–10.6)
CHLORIDE SERPL-SCNC: 105 MMOL/L (ref 99–110)
CO2 SERPL-SCNC: 23 MMOL/L (ref 21–32)
CREAT SERPL-MCNC: 0.7 MG/DL (ref 0.6–1.1)
DEPRECATED RDW RBC AUTO: 13.5 % (ref 12.4–15.4)
EOSINOPHIL # BLD: 0.1 K/UL (ref 0–0.6)
EOSINOPHIL NFR BLD: 0.9 %
GFR SERPLBLD CREATININE-BSD FMLA CKD-EPI: >90 ML/MIN/{1.73_M2}
GLUCOSE SERPL-MCNC: 112 MG/DL (ref 70–99)
HCT VFR BLD AUTO: 38.9 % (ref 36–48)
HGB BLD-MCNC: 13.1 G/DL (ref 12–16)
LYMPHOCYTES # BLD: 2.6 K/UL (ref 1–5.1)
LYMPHOCYTES NFR BLD: 17.5 %
MCH RBC QN AUTO: 30.1 PG (ref 26–34)
MCHC RBC AUTO-ENTMCNC: 33.6 G/DL (ref 31–36)
MCV RBC AUTO: 89.5 FL (ref 80–100)
MONOCYTES # BLD: 1.5 K/UL (ref 0–1.3)
MONOCYTES NFR BLD: 10 %
NEUTROPHILS # BLD: 10.4 K/UL (ref 1.7–7.7)
NEUTROPHILS NFR BLD: 71 %
PLATELET # BLD AUTO: 490 K/UL (ref 135–450)
PMV BLD AUTO: 8.4 FL (ref 5–10.5)
POTASSIUM SERPL-SCNC: 4 MMOL/L (ref 3.5–5.1)
RBC # BLD AUTO: 4.35 M/UL (ref 4–5.2)
SODIUM SERPL-SCNC: 139 MMOL/L (ref 136–145)
WBC # BLD AUTO: 14.7 K/UL (ref 4–11)

## 2025-02-23 PROCEDURE — 6370000000 HC RX 637 (ALT 250 FOR IP): Performed by: HOSPITALIST

## 2025-02-23 PROCEDURE — 36415 COLL VENOUS BLD VENIPUNCTURE: CPT

## 2025-02-23 PROCEDURE — 80048 BASIC METABOLIC PNL TOTAL CA: CPT

## 2025-02-23 PROCEDURE — 2500000003 HC RX 250 WO HCPCS: Performed by: HOSPITALIST

## 2025-02-23 PROCEDURE — 85025 COMPLETE CBC W/AUTO DIFF WBC: CPT

## 2025-02-23 PROCEDURE — 94640 AIRWAY INHALATION TREATMENT: CPT

## 2025-02-23 PROCEDURE — 6360000002 HC RX W HCPCS: Performed by: HOSPITALIST

## 2025-02-23 PROCEDURE — 94760 N-INVAS EAR/PLS OXIMETRY 1: CPT

## 2025-02-23 RX ORDER — ALBUTEROL SULFATE 90 UG/1
2 INHALANT RESPIRATORY (INHALATION) 4 TIMES DAILY PRN
Qty: 54 G | Refills: 1 | Status: SHIPPED | OUTPATIENT
Start: 2025-02-23

## 2025-02-23 RX ORDER — GUAIFENESIN 600 MG/1
600 TABLET, EXTENDED RELEASE ORAL 2 TIMES DAILY
Qty: 14 TABLET | Refills: 0 | Status: SHIPPED | OUTPATIENT
Start: 2025-02-23 | End: 2025-03-02

## 2025-02-23 RX ORDER — LEVOFLOXACIN 750 MG/1
750 TABLET, FILM COATED ORAL DAILY
Qty: 5 TABLET | Refills: 0 | Status: SHIPPED | OUTPATIENT
Start: 2025-02-23 | End: 2025-02-28

## 2025-02-23 RX ADMIN — ENOXAPARIN SODIUM 40 MG: 100 INJECTION SUBCUTANEOUS at 08:21

## 2025-02-23 RX ADMIN — GUAIFENESIN 600 MG: 600 TABLET ORAL at 08:21

## 2025-02-23 RX ADMIN — IPRATROPIUM BROMIDE AND ALBUTEROL SULFATE 1 DOSE: 2.5; .5 SOLUTION RESPIRATORY (INHALATION) at 07:45

## 2025-02-23 RX ADMIN — SODIUM CHLORIDE, PRESERVATIVE FREE 10 ML: 5 INJECTION INTRAVENOUS at 08:22

## 2025-02-23 RX ADMIN — AZITHROMYCIN 500 MG: 500 TABLET, FILM COATED ORAL at 08:21

## 2025-02-23 RX ADMIN — IPRATROPIUM BROMIDE AND ALBUTEROL SULFATE 1 DOSE: 2.5; .5 SOLUTION RESPIRATORY (INHALATION) at 11:39

## 2025-02-23 ASSESSMENT — PAIN SCALES - GENERAL: PAINLEVEL_OUTOF10: 0

## 2025-02-23 NOTE — CARE COORDINATION
Discharge order noted. Chart reviewed. Plan is to return home. Previously denied needs. No additional discharge needs identified at this time.    Electronically signed by Bharati Sweeney RN MSN on 2/23/2025 at 9:00 AM

## 2025-02-23 NOTE — PLAN OF CARE
Problem: Discharge Planning  Goal: Discharge to home or other facility with appropriate resources  2/23/2025 0930 by Baldo Jang RN  Outcome: Progressing  Flowsheets (Taken 2/23/2025 0821)  Discharge to home or other facility with appropriate resources:   Identify barriers to discharge with patient and caregiver   Arrange for needed discharge resources and transportation as appropriate  2/22/2025 2359 by Bethel Bardales RN  Outcome: Progressing     Problem: Pain  Goal: Verbalizes/displays adequate comfort level or baseline comfort level  2/23/2025 0930 by Baldo Jang RN  Outcome: Progressing  2/22/2025 2359 by Bethel Bardales RN  Outcome: Progressing     Problem: Safety - Adult  Goal: Free from fall injury  2/23/2025 0930 by Baldo Jang RN  Outcome: Progressing  2/22/2025 2359 by Bethel Bardales RN  Outcome: Progressing     Problem: Respiratory - Adult  Goal: Achieves optimal ventilation and oxygenation  2/23/2025 0930 by Baldo Jang RN  Outcome: Progressing  2/22/2025 2359 by Btehel Bardales RN  Outcome: Progressing     Problem: Cardiovascular - Adult  Goal: Maintains optimal cardiac output and hemodynamic stability  2/23/2025 0930 by Baldo Jang RN  Outcome: Progressing  2/22/2025 2359 by Bethel Bardales RN  Outcome: Progressing     Problem: Infection - Adult  Goal: Absence of infection at discharge  2/23/2025 0930 by Baldo Jang RN  Outcome: Progressing  2/22/2025 2359 by Bethel Bardales RN  Outcome: Progressing

## 2025-02-23 NOTE — PROGRESS NOTES
[facility]  [address] [facility] [address]  Phone: [phone]             February 23, 2025    Patient: Octavia Gonsalez   YOB: 1975   Date of Visit: 2/21/2025       To Whom It May Concern:    Octavia Gonsalez was seen and treated in our facility  beginning 2/21/2025 until  2/23/2025. She may benefit from bed rest for next 4 to 5 days.      Sincerely,       Sade Tran MD         Signature:__________________________________

## 2025-02-23 NOTE — PLAN OF CARE
Problem: Discharge Planning  Goal: Discharge to home or other facility with appropriate resources  2/22/2025 2359 by Bethel Bardales RN  Outcome: Progressing  2/22/2025 1115 by Baldo Jang RN  Outcome: Progressing  Flowsheets (Taken 2/22/2025 0831)  Discharge to home or other facility with appropriate resources:   Identify barriers to discharge with patient and caregiver   Arrange for needed discharge resources and transportation as appropriate     Problem: Pain  Goal: Verbalizes/displays adequate comfort level or baseline comfort level  2/22/2025 2359 by Bethel Bardales RN  Outcome: Progressing  2/22/2025 1115 by Baldo Jang RN  Outcome: Progressing     Problem: Safety - Adult  Goal: Free from fall injury  2/22/2025 2359 by Bethel Bardales RN  Outcome: Progressing  2/22/2025 1115 by Baldo Jang RN  Outcome: Progressing     Problem: Respiratory - Adult  Goal: Achieves optimal ventilation and oxygenation  2/22/2025 2359 by Bethel Bardales RN  Outcome: Progressing  2/22/2025 1115 by Baldo Jang RN  Outcome: Progressing     Problem: Cardiovascular - Adult  Goal: Maintains optimal cardiac output and hemodynamic stability  2/22/2025 2359 by Bethel Bardales RN  Outcome: Progressing  2/22/2025 1115 by Baldo Jang RN  Outcome: Progressing     Problem: Infection - Adult  Goal: Absence of infection at discharge  2/22/2025 2359 by Bethel Bardales RN  Outcome: Progressing  2/22/2025 1115 by Baldo Jang RN  Outcome: Progressing

## 2025-02-23 NOTE — DISCHARGE SUMMARY
Hospital Medicine Discharge Summary    Patient ID: Octavia Gonsalez      Patient's PCP: Felicity Barkley MD    Admit Date: 2/21/2025     Discharge Date:   2/23/25    Admitting Physician: Sade Tran MD     Discharge Physician: Sade Tran MD     Discharge Diagnoses:       Active Hospital Problems    Diagnosis     Pneumonia, unspecified organism [J18.9]        The patient was seen and examined on day of discharge and this discharge summary is in conjunction with any daily progress note from day of discharge.    Hospital Course:   Octavia Gonsalez is a 49 y.o. female with pmh of  who presents with      C/o cough  C/o SOB  C/o fever  C/o generalized weakness      Onset of symptoms since ~ 8- 9 days , she went to  few days ago and was dc home on Augmentin        Telemetry monitering  Empiric IV antibiotics : rocephin and zithromax  Duonebs   O2 via NC to keep O2 sat > 90 %  Check PNA panl , Procal  Check MRSA nasal probe  Blood culture x 2      DC IVF on 2/22     Chronic conditions :     Recently quit smoking as per patient        2/23/      Clinically stable  Vital signs stable  Denies chest pain or sob      Denies abdominal pain , nausea, vomiting , overt GI bleed , fever , chills     Respiratory status improved    She feels like going home     Physical Exam Performed:     BP (!) 155/83   Pulse 88   Temp 98.4 °F (36.9 °C) (Oral)   Resp 17   Ht 1.676 m (5' 6\")   Wt 58.2 kg (128 lb 4.9 oz)   SpO2 96%   BMI 20.71 kg/m²       General appearance:  No apparent distress, appears stated age and cooperative.  HEENT:  Normal cephalic, atraumatic without obvious deformity. Pupils equal, round, and reactive to light.  Extra ocular muscles intact. Conjunctivae/corneas clear.  Neck: Supple, with full range of motion. No jugular venous distention. Trachea midline.  Respiratory:  Normal respiratory effort. Clear to auscultation, bilaterally without Rales/Wheezes/Rhonchi.  Cardiovascular:  Regular rate

## 2025-02-23 NOTE — PROGRESS NOTES
Removed IV without complications, given discharge instructions to the patient, all questions answered, pt verbalized understanding, pt gathered all belongings and walked the pickett for discharge home.     Electronically signed by Baldo Jang RN on 2/23/25 at 1:30 PM EST

## 2025-02-25 LAB
BACTERIA BLD CULT ORG #2: NORMAL
BACTERIA BLD CULT: NORMAL

## 2025-04-07 ENCOUNTER — HOSPITAL ENCOUNTER (EMERGENCY)
Age: 50
Discharge: HOME | End: 2025-04-07
Payer: COMMERCIAL

## 2025-04-07 ENCOUNTER — APPOINTMENT (OUTPATIENT)
Dept: GENERAL RADIOLOGY | Age: 50
End: 2025-04-07
Payer: COMMERCIAL

## 2025-04-07 VITALS
RESPIRATION RATE: 18 BRPM | SYSTOLIC BLOOD PRESSURE: 144 MMHG | DIASTOLIC BLOOD PRESSURE: 98 MMHG | HEART RATE: 82 BPM | OXYGEN SATURATION: 98 % | WEIGHT: 139.33 LBS | BODY MASS INDEX: 22.49 KG/M2 | TEMPERATURE: 98.1 F

## 2025-04-07 DIAGNOSIS — Z20.822 LAB TEST NEGATIVE FOR COVID-19 VIRUS: ICD-10-CM

## 2025-04-07 DIAGNOSIS — R05.1 ACUTE COUGH: Primary | ICD-10-CM

## 2025-04-07 DIAGNOSIS — R93.89 ABNORMAL CXR: ICD-10-CM

## 2025-04-07 LAB
FLUAV + FLUBV AG NOSE IA.RAPID: NOT DETECTED
FLUAV + FLUBV AG NOSE IA.RAPID: NOT DETECTED
SARS-COV-2 RDRP RESP QL NAA+PROBE: NOT DETECTED

## 2025-04-07 PROCEDURE — 6370000000 HC RX 637 (ALT 250 FOR IP): Performed by: NURSE PRACTITIONER

## 2025-04-07 PROCEDURE — 99284 EMERGENCY DEPT VISIT MOD MDM: CPT

## 2025-04-07 PROCEDURE — 87635 SARS-COV-2 COVID-19 AMP PRB: CPT

## 2025-04-07 PROCEDURE — 87502 INFLUENZA DNA AMP PROBE: CPT

## 2025-04-07 PROCEDURE — 71046 X-RAY EXAM CHEST 2 VIEWS: CPT

## 2025-04-07 RX ORDER — LEVOFLOXACIN 500 MG/1
500 TABLET, FILM COATED ORAL ONCE
Status: DISCONTINUED | OUTPATIENT
Start: 2025-04-07 | End: 2025-04-07

## 2025-04-07 RX ORDER — BENZONATATE 100 MG/1
100 CAPSULE ORAL ONCE
Status: COMPLETED | OUTPATIENT
Start: 2025-04-07 | End: 2025-04-07

## 2025-04-07 RX ORDER — GUAIFENESIN/DEXTROMETHORPHAN 100-10MG/5
5 SYRUP ORAL 3 TIMES DAILY PRN
Qty: 105 ML | Refills: 0 | Status: SHIPPED | OUTPATIENT
Start: 2025-04-07 | End: 2025-04-14

## 2025-04-07 RX ORDER — LEVOFLOXACIN 750 MG/1
750 TABLET, FILM COATED ORAL DAILY
Qty: 5 TABLET | Refills: 0 | Status: SHIPPED | OUTPATIENT
Start: 2025-04-07 | End: 2025-04-12 | Stop reason: ALTCHOICE

## 2025-04-07 RX ADMIN — BENZONATATE 100 MG: 100 CAPSULE ORAL at 21:42

## 2025-04-07 RX ADMIN — LEVOFLOXACIN 750 MG: 500 TABLET, FILM COATED ORAL at 23:46

## 2025-04-08 NOTE — DISCHARGE INSTRUCTIONS
Return to the ED for new or worsening symptoms including, limited to, vomiting fevers not relieved by medications, inability to tolerate food or drink, severe shortness of breath, blue lips, chest pain, passing out, feeling scared, pass out, other symptoms/concerns.      Medications as prescribed.  Drink plenty of fluids get plenty of rest.      Your x-ray reveals that you have partial improvement of pneumonia.  Therefore we will treat for pneumonia with a different antibiotic if  symptoms worsen you may require admission.    Follow-up with your primary care provider for reevaluation next 1 to 2 days.   DISPLAY PLAN FREE TEXT DISPLAY PLAN FREE TEXT DISPLAY PLAN FREE TEXT DISPLAY PLAN FREE TEXT DISPLAY PLAN FREE TEXT DISPLAY PLAN FREE TEXT

## 2025-04-08 NOTE — ED PROVIDER NOTES
Felicity Maurer MD  [address] [facility] [address]  [facility] [address]  [phone]    Call in 1 day      [facility] Emergency Department  [address] [facility] [address]  [phone]  Go to   As needed      DISCHARGE MEDICATIONS:  Discharge Medication List as of 4/7/2025 11:42 PM        START taking these medications    Details   levoFLOXacin (LEVAQUIN) 750 MG tablet Take 1 tablet by mouth daily for 5 days, Disp-5 tablet, R-0Normal      guaiFENesin-dextromethorphan (ROBITUSSIN DM) 100-10 MG/5ML syrup Take 5 mLs by mouth 3 times daily as needed for Cough, Disp-105 mL, R-0Normal             DISCONTINUED MEDICATIONS:  Discharge Medication List as of 4/7/2025 11:42 PM                 (Please note that portions of this note were completed with a voice recognition program.  Efforts were made to edit the dictations but occasionally words are mis-transcribed.)    CARLIN Garcia CNP (electronically signed)       Tammy Grimes APRN - CNP  04/10/25 0015

## 2025-04-08 NOTE — ED NOTES
D/C: Order noted for d/c. Pt confirmed d/c paperwork has correct name. Discharge and education instructions reviewed with patient. Teach-back successful.  Pt verbalized understanding and denied questions at this time. No acute distress noted. Patient instructed to follow-up as noted - return to emergency department if symptoms worsen. Patient verbalized understanding. Discharged per EDMD with discharge instructions. Pt discharged to private vehicle. Patient stable upon departure. Thanked patient for choosing [facility] for care.

## 2025-04-10 ASSESSMENT — ENCOUNTER SYMPTOMS
BACK PAIN: 0
ABDOMINAL PAIN: 0
DIARRHEA: 0
EYE PAIN: 0
VOMITING: 0
NAUSEA: 0
COUGH: 1
SORE THROAT: 0
SHORTNESS OF BREATH: 1
ANAL BLEEDING: 0

## 2025-04-12 ENCOUNTER — OFFICE VISIT (OUTPATIENT)
Age: 50
End: 2025-04-12

## 2025-04-12 VITALS — BODY MASS INDEX: 22.44 KG/M2 | OXYGEN SATURATION: 94 % | TEMPERATURE: 98.4 F | WEIGHT: 139 LBS | HEART RATE: 94 BPM

## 2025-04-12 DIAGNOSIS — J18.9 PNEUMONIA OF LEFT LUNG DUE TO INFECTIOUS ORGANISM, UNSPECIFIED PART OF LUNG: Primary | ICD-10-CM

## 2025-04-12 DIAGNOSIS — R05.2 SUBACUTE COUGH: ICD-10-CM

## 2025-04-12 RX ORDER — IPRATROPIUM BROMIDE AND ALBUTEROL SULFATE 2.5; .5 MG/3ML; MG/3ML
1 SOLUTION RESPIRATORY (INHALATION) ONCE
Status: COMPLETED | OUTPATIENT
Start: 2025-04-12 | End: 2025-04-12

## 2025-04-12 RX ORDER — DEXTROMETHORPHAN HYDROBROMIDE AND PROMETHAZINE HYDROCHLORIDE 15; 6.25 MG/5ML; MG/5ML
10 SYRUP ORAL
Qty: 100 ML | Refills: 0 | Status: SHIPPED | OUTPATIENT
Start: 2025-04-12 | End: 2025-04-22

## 2025-04-12 RX ORDER — BENZONATATE 100 MG/1
100 CAPSULE ORAL 3 TIMES DAILY PRN
Qty: 30 CAPSULE | Refills: 0 | Status: SHIPPED | OUTPATIENT
Start: 2025-04-12 | End: 2025-04-22

## 2025-04-12 RX ORDER — PREDNISONE 20 MG/1
20 TABLET ORAL 2 TIMES DAILY
Qty: 10 TABLET | Refills: 0 | Status: SHIPPED | OUTPATIENT
Start: 2025-04-12 | End: 2025-04-17

## 2025-04-12 RX ADMIN — IPRATROPIUM BROMIDE AND ALBUTEROL SULFATE 1 DOSE: 2.5; .5 SOLUTION RESPIRATORY (INHALATION) at 17:29

## 2025-04-12 NOTE — PROGRESS NOTES
membrane, ear canal and external ear normal.      Left Ear: Tympanic membrane, ear canal and external ear normal.      Nose: Nose normal.      Mouth/Throat:      Mouth: Mucous membranes are moist.      Pharynx: Oropharynx is clear.   Eyes:      Extraocular Movements: Extraocular movements intact.      Conjunctiva/sclera: Conjunctivae normal.      Pupils: Pupils are equal, round, and reactive to light.   Cardiovascular:      Rate and Rhythm: Normal rate and regular rhythm.      Heart sounds: Normal heart sounds.   Pulmonary:      Effort: Pulmonary effort is normal.      Breath sounds: Normal breath sounds.   Skin:     General: Skin is warm and dry.   Neurological:      Mental Status: She is alert.   Psychiatric:         Mood and Affect: Mood normal.         Behavior: Behavior normal.         Thought Content: Thought content normal.         Judgment: Judgment normal.               An electronic signature was used to authenticate this note.    CARLIN Mcneill - CNP